# Patient Record
Sex: MALE | Race: BLACK OR AFRICAN AMERICAN | HISPANIC OR LATINO | URBAN - METROPOLITAN AREA
[De-identification: names, ages, dates, MRNs, and addresses within clinical notes are randomized per-mention and may not be internally consistent; named-entity substitution may affect disease eponyms.]

---

## 2023-04-30 ENCOUNTER — EMERGENCY (EMERGENCY)
Facility: HOSPITAL | Age: 43
LOS: 1 days | Discharge: HOME | End: 2023-04-30
Attending: EMERGENCY MEDICINE | Admitting: EMERGENCY MEDICINE
Payer: COMMERCIAL

## 2023-04-30 VITALS
RESPIRATION RATE: 16 BRPM | SYSTOLIC BLOOD PRESSURE: 160 MMHG | HEART RATE: 88 BPM | TEMPERATURE: 98 F | OXYGEN SATURATION: 100 % | DIASTOLIC BLOOD PRESSURE: 100 MMHG

## 2023-04-30 VITALS
OXYGEN SATURATION: 100 % | SYSTOLIC BLOOD PRESSURE: 160 MMHG | DIASTOLIC BLOOD PRESSURE: 85 MMHG | TEMPERATURE: 100 F | HEART RATE: 86 BPM | RESPIRATION RATE: 16 BRPM

## 2023-04-30 PROCEDURE — 99284 EMERGENCY DEPT VISIT MOD MDM: CPT | Mod: 25

## 2023-04-30 PROCEDURE — 73030 X-RAY EXAM OF SHOULDER: CPT | Mod: 26,LT

## 2023-04-30 PROCEDURE — 29125 APPL SHORT ARM SPLINT STATIC: CPT

## 2023-04-30 RX ORDER — ACETAMINOPHEN 500 MG
975 TABLET ORAL ONCE
Refills: 0 | Status: COMPLETED | OUTPATIENT
Start: 2023-04-30 | End: 2023-04-30

## 2023-04-30 RX ORDER — LIDOCAINE 4 G/100G
1 CREAM TOPICAL ONCE
Refills: 0 | Status: COMPLETED | OUTPATIENT
Start: 2023-04-30 | End: 2023-04-30

## 2023-04-30 RX ORDER — LIDOCAINE 4 G/100G
1 CREAM TOPICAL
Qty: 7 | Refills: 1
Start: 2023-04-30 | End: 2023-05-13

## 2023-04-30 RX ADMIN — Medication 975 MILLIGRAM(S): at 08:36

## 2023-04-30 RX ADMIN — Medication 975 MILLIGRAM(S): at 09:06

## 2023-04-30 RX ADMIN — LIDOCAINE 1 PATCH: 4 CREAM TOPICAL at 09:25

## 2023-04-30 NOTE — ED PROVIDER NOTE - PATIENT PORTAL LINK FT
You can access the FollowMyHealth Patient Portal offered by [facility] by registering at the following website: [email or web address]. By joining [facility]’s FollowMyHealth portal, you will also be able to view your health information using other applications (apps) compatible with our system.

## 2023-04-30 NOTE — ED PROVIDER NOTE - ATTENDING CONTRIBUTION TO CARE
The patient is a 42y Male who has no pertinent past medical history PTED w/ left upper post shoulder pain after lifting weights    Vital Signs Last 24 Hrs  T(F): 98.3 HR: 88 BP: 160/100 RR: 16 SpO2: 100%   PE: as described; my additions and exceptions are noted in the chart    DATA:  EKG: pending at time of evaluation  LAB: Pending at time of evaluation    IMPRESSION/RISK:  Dx= probable supraspinatus injury    Consideration include analgesics film to r/o injury d/c with sports/ortho f/u  Plan  as above  sling for 24hours comfort

## 2023-04-30 NOTE — ED PROVIDER NOTE - OBJECTIVE STATEMENT
42-year-old male without significant past medical history comes in with 2 to 3 days of left shoulder pain.  Patient reports that he went to the gym for the first time in a year and a half he did not feel any acute pain at that time but over the last 2 to 3 days has developed significant left shoulder pain.  Patient has decreased active and passive range of motion of his left shoulder due to pain.  Patient took Motrin x1 without significant relief and was unable to sleep last night due to pain.  Otherwise patient has no headache, chest pain, shortness of breath, N/V/D/abdominal pain, dysuria, peripheral edema, fever/chills.

## 2023-04-30 NOTE — ED PROVIDER NOTE - PHYSICAL EXAMINATION
GENERAL: well appearing in no acute distress, non-toxic appearing  HEAD: normocephalic, atraumatic  HEENT: normal conjunctiva, oral mucosa moist, uvula midline  CARDIAC: regular rate and rhythm, normal S1S2, no appreciable murmurs, 2+ pulses in UE/LE b/l  PULM: normal breath sounds, clear to ascultation bilaterally, no rales, rhonchi, wheezing  GI: abdomen nondistended, soft, nontender, no guarding, rebound tenderness  : no CVA tenderness b/l, no suprapubic tenderness  NEURO: moving all 4 extremities, no focal deficits, normal speech, AAOx3   MSK: no peripheral edema, no calf tenderness b/l, Pain with passive and active ROM of the left shoulder  SKIN: well-perfused, extremities warm, no visible rashes  PSYCH: appropriate mood and affect

## 2023-04-30 NOTE — ED PROVIDER NOTE - NSFOLLOWUPINSTRUCTIONS_ED_ALL_ED_FT
We've got you covered from head to toe    Our specialty programs:      [facility]  [phone]  [email or web address]    Sports Program  [phone]  [email or web address]       (Scheduling) team hours of operation:  Monday through Thursday, 7am to 8:30pm  Friday, 7am to 7pm  Saturday, 8am to 4pm    Shoulder Pain WHAT YOU NEED TO KNOW:    Shoulder pain is a common problem that can affect your daily activities. Pain can be caused by a problem within your shoulder, such as soreness of a tendon or bursa. A tendon is a cord of tough tissue that connects your muscles to your bones. The bursa is a fluid-filled sac that acts as a cushion between a bone and a tendon. Shoulder pain may also be caused by pain that spreads to your shoulder from another part of your body.  Shoulder Anatomy    DISCHARGE INSTRUCTIONS:    Return to the emergency department if:    You have severe pain.    You cannot move your arm or shoulder.    You have numbness or tingling in your shoulder or arm.  Contact your healthcare provider if:    Your pain gets worse or does not go away with treatment.    You have trouble moving your arm or shoulder.    You have questions or concerns about your condition or care.  Medicines: You may need any of the following:    Acetaminophen decreases pain and fever. It is available without a doctor's order. Ask how much to take and how often to take it. Follow directions. Read the labels of all other medicines you are using to see if they also contain acetaminophen, or ask your doctor or pharmacist. Acetaminophen can cause liver damage if not taken correctly. Do not use more than 4 grams (4,000 milligrams) total of acetaminophen in one day.    NSAIDs, such as ibuprofen, help decrease swelling, pain, and fever. This medicine is available with or without a doctor's order. NSAIDs can cause stomach bleeding or kidney problems in certain people. If you take blood thinner medicine, always ask your healthcare provider if NSAIDs are safe for you. Always read the medicine label and follow directions.    Take your medicine as directed. Contact your healthcare provider if you think your medicine is not helping or if you have side effects. Tell him of her if you are allergic to any medicine. Keep a list of the medicines, vitamins, and herbs you take. Include the amounts, and when and why you take them. Bring the list or the pill bottles to follow-up visits. Carry your medicine list with you in case of an emergency.  Manage your symptoms:    Apply ice on your shoulder for 20 to 30 minutes every 2 hours or as directed. Use an ice pack, or put crushed ice in a plastic bag. Cover it with a towel before you apply it to your shoulder. Ice helps prevent tissue damage and decreases swelling and pain.    Apply heat if ice does not help your symptoms. Apply heat on your shoulder for 20 to 30 minutes every 2 hours for as many days as directed. Heat helps decrease pain and muscle spasms.    Limit activities as directed. Try to avoid repeated overhead movements.    Go to physical or occupational therapy as directed. A physical therapist teaches you exercises to help improve movement and strength, and to decrease pain. An occupational therapist teaches you skills to help with your daily activities.  Prevent shoulder pain:    Maintain a good range of motion in your shoulder. Ask your healthcare provider which exercises you should do on a regular basis after you have healed.    Stretch and strengthen your shoulder. Use proper technique during exercises and sports.  Follow up with your healthcare provider or orthopedist as directed: Write down your questions so you remember to ask them during your visits.

## 2023-04-30 NOTE — ED ADULT NURSE NOTE - OBJECTIVE STATEMENT
Pt received to int 9 , awake and alert, A&OX4, ambulatory. C/o L shoulder pain x2 days, worsening today. States he believes it is from overuse at the gym. Limited ROM to LUE. +PMSX4 extremities. Respirations even and unlabored.  Denies CP, SOB, N/V, HA, dizziness, palpitations, any injury or trauma to site. Bed in lowest position, call bell within reach. Safety maintained.

## 2023-04-30 NOTE — ED PROVIDER NOTE - NSPTACCESSSVCSAPPTDETAILS_ED_ALL_ED_FT
Please help patient arrange follow-up with orthopedic surgery for further assessment of his left shoulder pain.

## 2023-04-30 NOTE — ED PROVIDER NOTE - CLINICAL SUMMARY MEDICAL DECISION MAKING FREE TEXT BOX
42-year-old male without significant past medical history presents with left shoulder pain after going to the gym.  Patient has had significant pain that is disrupting his sleep.  Concern for rotator cuff injury versus fracture.  Left shoulder XR, Tylenol, lidocaine ordered.  Likely dispo to home with Ortho follow-up.